# Patient Record
Sex: FEMALE | Race: WHITE | NOT HISPANIC OR LATINO | Employment: UNEMPLOYED | URBAN - METROPOLITAN AREA
[De-identification: names, ages, dates, MRNs, and addresses within clinical notes are randomized per-mention and may not be internally consistent; named-entity substitution may affect disease eponyms.]

---
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
## 2022-02-09 DIAGNOSIS — Z30.41 ENCOUNTER FOR SURVEILLANCE OF CONTRACEPTIVE PILLS: ICD-10-CM

## 2022-02-09 RX ORDER — DESOGESTREL AND ETHINYL ESTRADIOL AND ETHINYL ESTRADIOL 21-5 (28)
KIT ORAL
Qty: 28 TABLET | Refills: 3 | Status: SHIPPED | OUTPATIENT
Start: 2022-02-09 | End: 2022-07-01

## 2022-08-25 DIAGNOSIS — Z30.41 ENCOUNTER FOR SURVEILLANCE OF CONTRACEPTIVE PILLS: ICD-10-CM

## 2022-08-27 RX ORDER — DESOGESTREL AND ETHINYL ESTRADIOL AND ETHINYL ESTRADIOL 21-5 (28)
KIT ORAL
Qty: 28 TABLET | Refills: 1 | Status: SHIPPED | OUTPATIENT
Start: 2022-08-27 | End: 2022-10-24

## 2022-09-09 ENCOUNTER — OFFICE VISIT (OUTPATIENT)
Dept: FAMILY MEDICINE CLINIC | Facility: CLINIC | Age: 22
End: 2022-09-09
Payer: COMMERCIAL

## 2022-09-09 ENCOUNTER — APPOINTMENT (OUTPATIENT)
Dept: LAB | Facility: CLINIC | Age: 22
End: 2022-09-09
Payer: COMMERCIAL

## 2022-09-09 VITALS
DIASTOLIC BLOOD PRESSURE: 72 MMHG | HEART RATE: 79 BPM | WEIGHT: 164 LBS | HEIGHT: 61 IN | SYSTOLIC BLOOD PRESSURE: 110 MMHG | BODY MASS INDEX: 30.96 KG/M2 | OXYGEN SATURATION: 99 %

## 2022-09-09 DIAGNOSIS — Z13.0 ENCOUNTER FOR SICKLE-CELL SCREENING: ICD-10-CM

## 2022-09-09 DIAGNOSIS — Z00.00 ANNUAL PHYSICAL EXAM: Primary | ICD-10-CM

## 2022-09-09 LAB — SICKLE CELLS BLD QL SMEAR: NEGATIVE

## 2022-09-09 PROCEDURE — 99395 PREV VISIT EST AGE 18-39: CPT | Performed by: FAMILY MEDICINE

## 2022-09-09 PROCEDURE — 85660 RBC SICKLE CELL TEST: CPT

## 2022-09-09 PROCEDURE — 36415 COLL VENOUS BLD VENIPUNCTURE: CPT

## 2022-09-09 PROCEDURE — 3725F SCREEN DEPRESSION PERFORMED: CPT | Performed by: FAMILY MEDICINE

## 2022-09-09 NOTE — PROGRESS NOTES
ADULT ANNUAL [address]  [facility]    NAME: Gissel Alvarenga  AGE: 23 y o  SEX: female  : 2000     DATE: 2022     Assessment and Plan:     Problem List Items Addressed This Visit        Other    Encounter for sickle-cell screening - Primary    Relevant Orders    Sickle cell screen        Patient is here for sports physical   Requires sickle cell screening test     Immunizations and preventive care screenings were discussed with patient today  Appropriate education was printed on patient's after visit summary  Counseling:  Dental Health: discussed importance of regular tooth brushing, flossing, and dental visits  · Exercise: the importance of regular exercise/physical activity was discussed  Recommend exercise 3-5 times per week for at least 30 minutes  BMI Counseling: Body mass index is 30 74 kg/m²  The BMI is above normal  Nutrition recommendations include decreasing portion sizes, encouraging healthy choices of fruits and vegetables and decreasing fast food intake  Exercise recommendations include moderate physical activity 150 minutes/week  No pharmacotherapy was ordered  Rationale for BMI follow-up plan is due to patient being overweight or obese  Depression Screening and Follow-up Plan: Patient was screened for depression during today's encounter  They screened negative with a PHQ-2 score of 0  No follow-ups on file  Chief Complaint:     Chief Complaint   Patient presents with    Annual Exam      History of Present Illness:     Adult Annual Physical   Patient here for a comprehensive physical exam  The patient reports no problems  Diet and Physical Activity  · Diet/Nutrition: well balanced diet  · Exercise: walking        Depression Screening  PHQ-2/9 Depression Screening    Little interest or pleasure in doing things: 0 - not at all  Feeling down, depressed, or hopeless: 0 - not at all  PHQ-2 Score: 0  PHQ-2 Interpretation: Negative depression screen       General Health  · Sleep: sleeps well  · Hearing: normal - bilateral   · Vision: no vision problems  · Dental: regular dental visits  /GYN Health  · Last menstrual period: regular  · Contraceptive method: oral contraceptives  · History of STDs?: no      Review of Systems:     Review of Systems   Constitutional: Negative  Respiratory: Negative  Negative for shortness of breath  Cardiovascular: Negative  Negative for chest pain and palpitations  Musculoskeletal: Negative for myalgias  Neurological: Negative  Psychiatric/Behavioral: Negative  Past Medical History:     Past Medical History:   Diagnosis Date    Allergic     Seasonal; Last assessed 6/3/2014    Allergic rhinitis       Past Surgical History:     Past Surgical History:   Procedure Laterality Date    TONSILLECTOMY        Social History:     Social History     Socioeconomic History    Marital status: Single     Spouse name: None    Number of children: None    Years of education: None    Highest education level: None   Occupational History    None   Tobacco Use    Smoking status: Never Smoker    Smokeless tobacco: Never Used    Tobacco comment: no exposure to second hand smoke     Vaping Use    Vaping Use: Never used   Substance and Sexual Activity    Alcohol use: Never    Drug use: Never    Sexual activity: Yes     Partners: Male   Other Topics Concern    None   Social History Narrative    None     Social Determinants of Health     Financial Resource Strain: Not on file   Food Insecurity: Not on file   Transportation Needs: Not on file   Physical Activity: Not on file   Stress: Not on file   Social Connections: Not on file   Intimate Partner Violence: Not on file   Housing Stability: Not on file      Family History:     Family History   Problem Relation Age of Onset    No Known Problems Mother     No Known Problems Father     Breast cancer Maternal Grandmother  Allergies Family     Diabetes Family     Heart disease Family     Hypertension Family       Current Medications:     Current Outpatient Medications   Medication Sig Dispense Refill    Viorele 0 15-0 02/0 01 MG (21/5) per tablet TAKE 1 TABLET BY MOUTH DAILY 28 tablet 1     No current facility-administered medications for this visit  Allergies: Allergies   Allergen Reactions    Pollen Extract       Physical Exam:     /72   Pulse 79   Ht 5' 1 25" (1 556 m)   Wt 74 4 kg (164 lb)   SpO2 99%   BMI 30 74 kg/m²     Physical Exam  Vitals and nursing note reviewed  HENT:      Right Ear: External ear normal       Left Ear: External ear normal    Eyes:      Conjunctiva/sclera: Conjunctivae normal    Cardiovascular:      Rate and Rhythm: Normal rate and regular rhythm  Heart sounds: Normal heart sounds  Pulmonary:      Effort: Pulmonary effort is normal       Breath sounds: Normal breath sounds  Abdominal:      General: Bowel sounds are normal       Palpations: Abdomen is soft  Musculoskeletal:         General: Normal range of motion  Cervical back: Normal range of motion and neck supple  Skin:     General: Skin is warm  Neurological:      Mental Status: She is alert and oriented to person, place, and time  Psychiatric:         Behavior: Behavior normal          Thought Content:  Thought content normal          Judgment: Judgment normal           [place] [id number]

## 2022-09-09 NOTE — PATIENT INSTRUCTIONS

## 2022-10-21 DIAGNOSIS — Z30.41 ENCOUNTER FOR SURVEILLANCE OF CONTRACEPTIVE PILLS: ICD-10-CM

## 2022-10-24 RX ORDER — DESOGESTREL AND ETHINYL ESTRADIOL AND ETHINYL ESTRADIOL 21-5 (28)
KIT ORAL
Qty: 28 TABLET | Refills: 1 | Status: SHIPPED | OUTPATIENT
Start: 2022-10-24

## 2022-12-20 DIAGNOSIS — Z30.41 ENCOUNTER FOR SURVEILLANCE OF CONTRACEPTIVE PILLS: ICD-10-CM

## 2022-12-20 RX ORDER — DESOGESTREL AND ETHINYL ESTRADIOL 21-5 (28)
1 KIT ORAL DAILY
Qty: 28 TABLET | Refills: 2 | Status: SHIPPED | OUTPATIENT
Start: 2022-12-20

## 2023-03-02 ENCOUNTER — ANNUAL EXAM (OUTPATIENT)
Dept: OBGYN CLINIC | Facility: CLINIC | Age: 23
End: 2023-03-02

## 2023-03-02 VITALS — SYSTOLIC BLOOD PRESSURE: 132 MMHG | DIASTOLIC BLOOD PRESSURE: 80 MMHG | WEIGHT: 164.8 LBS | BODY MASS INDEX: 30.89 KG/M2

## 2023-03-02 DIAGNOSIS — Z01.419 ENCOUNTER FOR GYNECOLOGICAL EXAMINATION (GENERAL) (ROUTINE) WITHOUT ABNORMAL FINDINGS: Primary | ICD-10-CM

## 2023-03-02 DIAGNOSIS — Z11.3 SCREENING FOR STD (SEXUALLY TRANSMITTED DISEASE): ICD-10-CM

## 2023-03-02 DIAGNOSIS — Z30.41 ENCOUNTER FOR SURVEILLANCE OF CONTRACEPTIVE PILLS: ICD-10-CM

## 2023-03-02 RX ORDER — DESOGESTREL AND ETHINYL ESTRADIOL 21-5 (28)
1 KIT ORAL DAILY
Qty: 84 TABLET | Refills: 3 | Status: SHIPPED | OUTPATIENT
Start: 2023-03-02

## 2023-03-02 NOTE — PROGRESS NOTES
Assessment/Plan   Diagnoses and all orders for this visit:    Encounter for gynecological examination (general) (routine) without abnormal findings  Pap smears will start at age 26 per the ASCCP guidelines  Screening for STD (sexually transmitted disease)  -     Chlamydia/GC amplified DNA by PCR  Encourage safe sexual practices; STI testing - C/G collected; STI labs declined    Encounter for surveillance of contraceptive pills  -     desogestrel-ethinyl estradiol (Viorele) 0 15-0 02/0 01 MG (21/5) per tablet; Take 1 tablet by mouth daily  Contraception - reviewed restart, how to take, missed pill protocol, birth control efficacy, back-up, STI protection  Discussion  I have discussed the importance of monthly self-breast exams, exercise and healthy diet as well as adequate intake of calcium and vitamin D  Encourage MVI q day and r/julissa importance of folic acid; Encourage 30-40 min weight bearing exercise most days of week  The patient has had the Gardasil vaccine series, which is recommended for patients from 526 years of age  All questions have been answered to her satisfaction  RTO for APE or sooner if needed    Subjective     HPI   Sergei Ewing is a 23 y o  female who presents for annual well woman exam  Plays softball for [employer] - going to [employer] this weekend for a tournament  Menarche - 13; LMP - 2/7/23; Periods are reg q month and last 4-5 days; No excessive bleeding; No intermenstrual bleeding or spotting; Cramps are tolerable  Stopped Viorele about 2 months ago because ran out of Rx and desires to go back on; No vulvar itch/burn; No vaginal itch/burn; No abn discharge or odor;  No urinary sx - burning/pain/frequency/hematuria  (+) SBEs - no breast masses, asymmetry, nipple discharge or bleeding, changes in skin of breast, or breast tenderness bilaterally  No abd/pelvic pain or HAs;   Pt is not currently in a sexual relationship; Last sexual encounter was shortly after stopping the pill - used condoms but has abstained since because no BC on board; No issues with intercourse; She is ok to have STI cultures done; declines hiv/hep testing; Feels safe at home  Current contraception: Viorele in the past (switched from Loestrin  due to headaches); condoms  Gardasil - completed  (+) PCP for routine Bw/care; Last Pap - none  History of abnormal Pap smear:   Last STI screen - none    Review of Systems   Constitutional: Negative for activity change, fatigue, fever and unexpected weight change  HENT: Negative for congestion, dental problem, sinus pressure and sinus pain  Eyes: Negative for visual disturbance  Respiratory: Negative for cough, shortness of breath and wheezing  Cardiovascular: Negative for chest pain and leg swelling  Gastrointestinal: Negative for abdominal distention, abdominal pain, blood in stool, constipation, diarrhea, nausea and vomiting  Endocrine: Negative for polydipsia  Genitourinary: Negative for difficulty urinating, dyspareunia, dysuria, frequency, hematuria, menstrual problem, pelvic pain, urgency, vaginal bleeding, vaginal discharge and vaginal pain  Musculoskeletal: Negative for arthralgias and back pain  Allergic/Immunologic: Negative for environmental allergies  Neurological: Negative for dizziness, seizures and headaches  Psychiatric/Behavioral: Negative for dysphoric mood and sleep disturbance  The patient is not nervous/anxious          The following portions of the patient's history were reviewed and updated as appropriate: allergies, current medications, past family history, past medical history, past social history, past surgical history and problem list          OB History        0    Para   0    Term   0       0    AB   0    Living   0       SAB   0    IAB   0    Ectopic   0    Multiple   0    Live Births   0                 Past Medical History:   Diagnosis Date   • Allergic     Seasonal; Last assessed 6/3/2014   • Allergic rhinitis        Past Surgical History:   Procedure Laterality Date   • TONSILLECTOMY         Family History   Problem Relation Age of Onset   • No Known Problems Mother    • No Known Problems Father    • Breast cancer Maternal Grandmother         dx    • Allergies Family    • Diabetes Family    • Heart disease Family    • Hypertension Family        Social History     Socioeconomic History   • Marital status: Single     Spouse name: Not on file   • Number of children: Not on file   • Years of education: Not on file   • Highest education level: Not on file   Occupational History   • Not on file   Tobacco Use   • Smoking status: Never   • Smokeless tobacco: Never   • Tobacco comments:     no exposure to second hand smoke  Vaping Use   • Vaping Use: Never used   Substance and Sexual Activity   • Alcohol use: Never   • Drug use: Never   • Sexual activity: Yes     Partners: Male   Other Topics Concern   • Not on file   Social History Narrative   • Not on file     Social Determinants of Health     Financial Resource Strain: Not on file   Food Insecurity: Not on file   Transportation Needs: Not on file   Physical Activity: Not on file   Stress: Not on file   Social Connections: Not on file   Intimate Partner Violence: Not on file   Housing Stability: Not on file         Current Outpatient Medications:   •  desogestrel-ethinyl estradiol (Viorele) 0 15-0 02/0 01 MG (21/5) per tablet, Take 1 tablet by mouth daily, Disp: 84 tablet, Rfl: 3    Allergies   Allergen Reactions   • Pollen Extract        Objective   Vitals:    03/02/23 1128   BP: 132/80   BP Location: Left arm   Patient Position: Sitting   Cuff Size: Standard   Weight: 74 8 kg (164 lb 12 8 oz)     Physical Exam  Vitals reviewed  Constitutional:       General: She is awake  She is not in acute distress  Appearance: Normal appearance  She is well-developed and well-groomed   She is not ill-appearing, toxic-appearing or diaphoretic  HENT:      Head: Normocephalic and atraumatic  Eyes:      Conjunctiva/sclera: Conjunctivae normal    Neck:      Thyroid: No thyroid mass, thyromegaly or thyroid tenderness  Cardiovascular:      Rate and Rhythm: Normal rate and regular rhythm  Heart sounds: Normal heart sounds  No murmur heard  Pulmonary:      Effort: Pulmonary effort is normal  No tachypnea, bradypnea or respiratory distress  Breath sounds: Normal breath sounds  No stridor or decreased air movement  No wheezing  Chest:   Breasts:     Breasts are symmetrical       Right: Normal  No swelling, bleeding, inverted nipple, mass, nipple discharge, skin change or tenderness  Left: Normal  No swelling, bleeding, inverted nipple, mass, nipple discharge, skin change or tenderness  Abdominal:      General: There is no distension  Palpations: Abdomen is soft  There is no hepatomegaly, splenomegaly or mass  Tenderness: There is no abdominal tenderness  Hernia: No hernia is present  There is no hernia in the left inguinal area or right inguinal area  Genitourinary:     General: Normal vulva  Exam position: Supine  Pubic Area: No rash or pubic lice  Labia:         Right: No rash, tenderness, lesion or injury  Left: No rash, tenderness, lesion or injury  Urethra: No prolapse, urethral pain, urethral swelling or urethral lesion  Vagina: Normal  No signs of injury and foreign body  No vaginal discharge, erythema, tenderness, bleeding, lesions or prolapsed vaginal walls  Cervix: No cervical motion tenderness, discharge, friability, lesion, erythema or cervical bleeding  Uterus: Not deviated, not enlarged, not fixed, not tender and no uterine prolapse  Adnexa:         Right: No mass, tenderness or fullness  Left: No mass, tenderness or fullness  Lymphadenopathy:      Cervical: No cervical adenopathy        Upper Body:      Right upper body: No supraclavicular or axillary adenopathy  Left upper body: No supraclavicular or axillary adenopathy  Lower Body: No right inguinal adenopathy  No left inguinal adenopathy  Skin:     General: Skin is warm and dry  Neurological:      Mental Status: She is alert and oriented to person, place, and time  Psychiatric:         Mood and Affect: Mood and affect normal          Speech: Speech normal          Behavior: Behavior normal  Behavior is cooperative  Thought Content:  Thought content normal          Judgment: Judgment normal

## 2023-03-03 LAB
C TRACH DNA SPEC QL NAA+PROBE: NEGATIVE
N GONORRHOEA DNA SPEC QL NAA+PROBE: NEGATIVE

## 2024-02-02 DIAGNOSIS — Z30.41 ENCOUNTER FOR SURVEILLANCE OF CONTRACEPTIVE PILLS: ICD-10-CM

## 2024-02-05 RX ORDER — DESOGESTREL AND ETHINYL ESTRADIOL AND ETHINYL ESTRADIOL 21-5 (28)
1 KIT ORAL DAILY
Qty: 84 TABLET | Refills: 0 | Status: SHIPPED | OUTPATIENT
Start: 2024-02-05

## 2024-02-21 PROBLEM — Z00.121 ENCOUNTER FOR ROUTINE CHILD HEALTH EXAMINATION WITH ABNORMAL FINDINGS: Status: RESOLVED | Noted: 2018-10-22 | Resolved: 2024-02-21

## 2024-04-03 DIAGNOSIS — Z30.41 ENCOUNTER FOR SURVEILLANCE OF CONTRACEPTIVE PILLS: ICD-10-CM

## 2024-04-03 RX ORDER — DESOGESTREL AND ETHINYL ESTRADIOL AND ETHINYL ESTRADIOL 21-5 (28)
1 KIT ORAL DAILY
Qty: 84 TABLET | Refills: 0 | Status: SHIPPED | OUTPATIENT
Start: 2024-04-03

## 2024-05-07 NOTE — PROGRESS NOTES
Assessment/Plan   Diagnoses and all orders for this visit:    Encounter for gynecological examination (general) (routine) without abnormal findings  -     Liquid-based pap, screening  The current ASCCP guidelines were reviewed. Patient's last pap was never and therefore, a pap is indicated at this time. I emphasized the importance of an annual pelvic and breast exam. Patient ok to have a pap done today.    Counseling for initiation of birth control method  -     etonogestrel-ethinyl estradiol (NuvaRing) 0.12-0.015 MG/24HR vaginal ring; Insert vaginally and leave in place for 3 consecutive weeks, then remove for 1 week.  Contraception - Various contraceptive options were reviewed including, but not limited to, barrier methods (condoms, diaphragm), both combination (pill, patch, vaginal ring) and progesterone- only (pill, Depo provera, and Nexplanon), intrauterine devices (tahira, Mirena and Paragard). The mechanisms, risks, benefits, and side effects of all methods were discussed. All questions have been answered to her satisfaction.  Desires Nuva Ring:  Insert the Nuva ring vaginally on the Sunday after your menstrual period begins.   Keep it in place for 3 weeks.    On the 4th Sunday remove the ring.  Place a new ring in on the following Sunday.  Repeat above.  It common to experience some irregular bleeding when newly starting the Nuva ring.  This should resolve after 3 months of use.  Also minor side effects such as breast tenderness, minor headaches and nausea may occur, but typically resolve after continuing on the ring for at least 3 months.    Call if you experience severe headaches, visual disturbances, chest pain, shortness of breath, abdominal pain, pain tingling or weakness in arms or legs.  Advise a back-up method, like condoms, during the first month on the nuva ring, if misses any change out, or is put on antibiotics.   Advise safe sexual practices and the importance of condoms to prevent the  transmission of STDs  Encourage to give a fair 3 mo trial - if dislikes or desires to discuss alternate options - call or RTO.    Discussion  I have discussed the importance of monthly self-breast exams, exercise and healthy diet as well as adequate intake of calcium and vitamin D. Encourage MVI q day and r/julissa importance of folic acid; Encourage 30-40 min weight bearing exercise most days of week  Encourage safe sexual practices; STI testing - declines  The patient has had the Gardasil vaccine series, which is recommended for patients from 9-45 years of age.   All questions have been answered to her satisfaction  RTO for APE or sooner if needed    Subjective     HPI   Yuly More is a 23 y.o. female who presents for annual well woman exam. 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  No vulvar itch/burn; No vaginal itch/burn; No abn discharge or odor; No urinary sx - burning/pain/frequency/hematuria  (+) SBEs - no breast masses, asymmetry, nipple discharge or bleeding, changes in skin of breast, or breast tenderness bilaterally  No abd/pelvic pain or Has out of the ordinary;   Pt is not currently sexually active - recent break up a couple months ago; No issues intercourse; She declines sti/hiv/hep testing; Feels safe at home  Current contraception: Viorele - stopped x 1 month because ran out of pills; But wanted to discuss alternate options since she found she was missing pills/doubling up more frequently  Gardasil - completed  (+) PCP for routine Bw/care;    Last Pap - none  History of abnormal Pap smear:   Last STI screen - 3/2/23 - C/G neg    Review of Systems   Constitutional:  Negative for activity  change, fatigue, fever and unexpected weight change (weight is fluctuating - since stopped softball - figuring out what she can eat - working on diet and exercise).   HENT:  Negative for congestion, dental problem, sinus pressure and sinus pain.    Eyes:  Negative for visual disturbance.   Respiratory:  Negative for cough, shortness of breath and wheezing.    Cardiovascular:  Negative for chest pain and leg swelling.   Gastrointestinal:  Negative for abdominal distention, abdominal pain, blood in stool, constipation, diarrhea, nausea and vomiting.   Endocrine: Negative for polydipsia.   Genitourinary:  Negative for difficulty urinating, dyspareunia, dysuria, frequency, hematuria, menstrual problem, pelvic pain, urgency, vaginal bleeding, vaginal discharge and vaginal pain.   Musculoskeletal:  Negative for arthralgias and back pain.   Allergic/Immunologic: Negative for environmental allergies.   Neurological:  Negative for dizziness, seizures and headaches.   Psychiatric/Behavioral:  Negative for dysphoric mood and sleep disturbance. The patient is not nervous/anxious.        The following portions of the patient's history were reviewed and updated as appropriate: allergies, current medications, past family history, past medical history, past social history, past surgical history, and problem list.         OB History          0    Para   0    Term   0       0    AB   0    Living   0         SAB   0    IAB   0    Ectopic   0    Multiple   0    Live Births   0                 Past Medical History:   Diagnosis Date    Allergic     Seasonal; Last assessed 6/3/2014    Allergic rhinitis        Past Surgical History:   Procedure Laterality Date    TONSILLECTOMY         Family History   Problem Relation Age of Onset    No Known Problems Mother     No Known Problems Father     Breast cancer Maternal Grandmother         dx     Allergies Family     Diabetes Family     Heart disease Family     Hypertension  "Family        Social History     Socioeconomic History    Marital status: Single     Spouse name: Not on file    Number of children: Not on file    Years of education: Not on file    Highest education level: Not on file   Occupational History    Not on file   Tobacco Use    Smoking status: Never    Smokeless tobacco: Never    Tobacco comments:     no exposure to second hand smoke.   Vaping Use    Vaping status: Never Used   Substance and Sexual Activity    Alcohol use: Yes     Comment: socially    Drug use: Never    Sexual activity: Yes     Partners: Male     Birth control/protection: Condom Male, Rhythm, Coitus interruptus   Other Topics Concern    Not on file   Social History Narrative    Not on file     Social Determinants of Health     Financial Resource Strain: Not on file   Food Insecurity: Not on file   Transportation Needs: Not on file   Physical Activity: Not on file   Stress: Not on file   Social Connections: Not on file   Intimate Partner Violence: Not on file   Housing Stability: Not on file         Current Outpatient Medications:     loratadine (CLARITIN) 10 mg tablet, Take 10 mg by mouth daily, Disp: , Rfl:     Viorele 0.15-0.02/0.01 MG (21/5) per tablet, TAKE 1 TABLET BY MOUTH DAILY (Patient not taking: Reported on 5/8/2024), Disp: 84 tablet, Rfl: 0    Allergies   Allergen Reactions    Pollen Extract        Objective   Vitals:    05/08/24 0753   BP: 120/78   BP Location: Left arm   Patient Position: Sitting   Cuff Size: Standard   Weight: 87.1 kg (192 lb)   Height: 5' 2\" (1.575 m)     Physical Exam  Vitals reviewed.   Constitutional:       General: She is awake. She is not in acute distress.     Appearance: Normal appearance. She is well-developed and well-groomed. She is not ill-appearing, toxic-appearing or diaphoretic.   HENT:      Head: Normocephalic and atraumatic.   Eyes:      Conjunctiva/sclera: Conjunctivae normal.   Neck:      Thyroid: No thyroid mass, thyromegaly or thyroid tenderness. "   Cardiovascular:      Rate and Rhythm: Normal rate and regular rhythm.      Heart sounds: Normal heart sounds. No murmur heard.  Pulmonary:      Effort: Pulmonary effort is normal. No tachypnea, bradypnea or respiratory distress.      Breath sounds: Normal breath sounds. No stridor or decreased air movement. No wheezing.   Chest:   Breasts:     Breasts are symmetrical.      Right: Normal. No swelling, bleeding, inverted nipple, mass, nipple discharge, skin change or tenderness.      Left: Normal. No swelling, bleeding, inverted nipple, mass, nipple discharge, skin change or tenderness.   Abdominal:      General: There is no distension.      Palpations: Abdomen is soft. There is no hepatomegaly, splenomegaly or mass.      Tenderness: There is no abdominal tenderness.      Hernia: No hernia is present. There is no hernia in the left inguinal area or right inguinal area.   Genitourinary:     General: Normal vulva.      Exam position: Supine.      Pubic Area: No rash or pubic lice.       Labia:         Right: No rash, tenderness, lesion or injury.         Left: No rash, tenderness, lesion or injury.       Urethra: No prolapse, urethral pain, urethral swelling or urethral lesion.      Vagina: Normal. No signs of injury and foreign body. No vaginal discharge, erythema, tenderness, bleeding, lesions or prolapsed vaginal walls.      Cervix: No cervical motion tenderness, discharge, friability, lesion, erythema or cervical bleeding.      Uterus: Not deviated, not enlarged, not fixed, not tender and no uterine prolapse.       Adnexa:         Right: No mass, tenderness or fullness.          Left: No mass, tenderness or fullness.     Lymphadenopathy:      Cervical: No cervical adenopathy.      Upper Body:      Right upper body: No supraclavicular or axillary adenopathy.      Left upper body: No supraclavicular or axillary adenopathy.      Lower Body: No right inguinal adenopathy. No left inguinal adenopathy.   Skin:      General: Skin is warm and dry.   Neurological:      Mental Status: She is alert and oriented to person, place, and time.   Psychiatric:         Mood and Affect: Mood and affect normal.         Speech: Speech normal.         Behavior: Behavior normal. Behavior is cooperative.         Thought Content: Thought content normal.         Judgment: Judgment normal.

## 2024-05-08 ENCOUNTER — ANNUAL EXAM (OUTPATIENT)
Dept: OBGYN CLINIC | Facility: CLINIC | Age: 24
End: 2024-05-08
Payer: COMMERCIAL

## 2024-05-08 VITALS
DIASTOLIC BLOOD PRESSURE: 78 MMHG | SYSTOLIC BLOOD PRESSURE: 120 MMHG | BODY MASS INDEX: 35.33 KG/M2 | WEIGHT: 192 LBS | HEIGHT: 62 IN

## 2024-05-08 DIAGNOSIS — Z01.419 ENCOUNTER FOR GYNECOLOGICAL EXAMINATION (GENERAL) (ROUTINE) WITHOUT ABNORMAL FINDINGS: Primary | ICD-10-CM

## 2024-05-08 DIAGNOSIS — Z30.09 COUNSELING FOR INITIATION OF BIRTH CONTROL METHOD: ICD-10-CM

## 2024-05-08 PROCEDURE — S0612 ANNUAL GYNECOLOGICAL EXAMINA: HCPCS | Performed by: PHYSICIAN ASSISTANT

## 2024-05-08 PROCEDURE — G0145 SCR C/V CYTO,THINLAYER,RESCR: HCPCS | Performed by: PHYSICIAN ASSISTANT

## 2024-05-08 RX ORDER — ETONOGESTREL AND ETHINYL ESTRADIOL VAGINAL RING .015; .12 MG/D; MG/D
RING VAGINAL
Qty: 3 EACH | Refills: 3 | Status: SHIPPED | OUTPATIENT
Start: 2024-05-08

## 2024-05-08 RX ORDER — LORATADINE 10 MG/1
10 TABLET ORAL DAILY
COMMUNITY

## 2024-05-15 LAB
LAB AP GYN PRIMARY INTERPRETATION: NORMAL
LAB AP LMP: NORMAL
Lab: NORMAL

## 2024-07-23 ENCOUNTER — TELEPHONE (OUTPATIENT)
Age: 24
End: 2024-07-23

## 2024-07-23 DIAGNOSIS — Z30.41 SURVEILLANCE FOR BIRTH CONTROL, ORAL CONTRACEPTIVES: Primary | ICD-10-CM

## 2024-07-23 RX ORDER — DESOGESTREL AND ETHINYL ESTRADIOL 21-5 (28)
1 KIT ORAL DAILY
Qty: 84 TABLET | Refills: 3 | Status: SHIPPED | OUTPATIENT
Start: 2024-07-23

## 2024-07-23 NOTE — TELEPHONE ENCOUNTER
Patient states Nuvaring not covered by insurance per pharmacy.  Can her nuvaring be switched to another form of birth control.

## 2024-07-23 NOTE — TELEPHONE ENCOUNTER
Spoke with Yuly - has primary and secondary insurance - was covered by one insurance and not the other for the past couple of months, however, will be ending with insurance that covers; Can check cost through [employer]; Can check with current insurance if either EluRyng (generic of nuva ring) or Annovera is covered by insurance; Reviewed Annovera is the same ring used for 12 months. Other option would be switching back to Viorele or trial of OE patch - concern for OCP at annual was missing pills; concern for patch now is works outside () and a lot of sweating; will plan to restart OCP this Sunday - reviewed how to take, back-up, birth control efficacy, missed pill protocol, STI protection. If finds rings are not options due to cost and pill not working well - recommend appt to discuss LARCs/other BC options

## 2024-08-23 ENCOUNTER — OFFICE VISIT (OUTPATIENT)
Dept: FAMILY MEDICINE CLINIC | Facility: CLINIC | Age: 24
End: 2024-08-23
Payer: COMMERCIAL

## 2024-08-23 VITALS
BODY MASS INDEX: 37.32 KG/M2 | DIASTOLIC BLOOD PRESSURE: 70 MMHG | OXYGEN SATURATION: 98 % | HEART RATE: 99 BPM | HEIGHT: 62 IN | WEIGHT: 202.8 LBS | RESPIRATION RATE: 16 BRPM | TEMPERATURE: 98.6 F | SYSTOLIC BLOOD PRESSURE: 122 MMHG

## 2024-08-23 DIAGNOSIS — F41.1 GAD (GENERALIZED ANXIETY DISORDER): Primary | ICD-10-CM

## 2024-08-23 DIAGNOSIS — R45.1 RESTLESS: ICD-10-CM

## 2024-08-23 PROCEDURE — 99204 OFFICE O/P NEW MOD 45 MIN: CPT | Performed by: FAMILY MEDICINE

## 2024-08-23 RX ORDER — ESCITALOPRAM OXALATE 5 MG/1
5 TABLET ORAL DAILY
Qty: 30 TABLET | Refills: 1 | Status: SHIPPED | OUTPATIENT
Start: 2024-08-23

## 2024-08-23 NOTE — PROGRESS NOTES
Ambulatory Visit  Name: Yuly More      : 2000      MRN: [id number]  Encounter Provider: Feliciano Hayes MD  Encounter Date: 2024   Encounter department: [facility]    Assessment & Plan   1. JOSTIN (generalized anxiety disorder)  Comments:  New patient here to discuss medical managemnt of JOSTIN. Newly diagnosed and scored 18 today on GAD7. She is seeing a therapist and denies prior hospitalization. Dicussed classed on medications including SSRI. Agreed to start lexapro. Aware of s/e. May increase dose with partial response in 2 weeks. RTC 4-6 weeks or sooner as needed   Orders:  -     escitalopram (LEXAPRO) 5 mg tablet; Take 1 tablet (5 mg total) by mouth daily  -     TSH, 3rd generation with Free T4 reflex; Future  -     Vitamin B12; Future  -     CBC and differential; Future  2. BMI 37.0-37.9, adult  Comments:  FBW ordered. Exercise encouraged for overall health including mental health  Orders:  -     Comprehensive metabolic panel; Future  -     Lipid panel; Future  3. Restless  -     Comprehensive metabolic panel; Future  -     TSH, 3rd generation with Free T4 reflex; Future  -     Vitamin B12; Future      JOTSIN-7 Flowsheet Screening      Flowsheet Row Most Recent Value   Over the last two weeks, how often have you been bothered by the following problems?     Feeling nervous, anxious, or on edge 2   Not being able to stop or control worrying 1   Worrying too much about different things 3   Trouble relaxing  3   Being so restless that it's hard to sit still 3   Becoming easily annoyed or irritable  3   Feeling afraid as if something awful might happen 3   How difficult have these problems made it for you to do your work, take care of things at home, or get along with other people?  Somewhat difficult   JOSTIN Score  18          PHQ-2/9 Depression Screening    Little interest or pleasure in doing things: 0 - not at all  Feeling down, depressed, or hopeless: 1 - several  "days  Trouble falling or staying asleep, or sleeping too much: 1 - several days  Feeling tired or having little energy: 2 - more than half the days  Poor appetite or overeatin - several days  Feeling bad about yourself - or that you are a failure or have let yourself or your family down: 0 - not at all  Trouble concentrating on things, such as reading the newspaper or watching television: 0 - not at all  Moving or speaking so slowly that other people could have noticed. Or the opposite - being so fidgety or restless that you have been moving around a lot more than usual: 0 - not at all  Thoughts that you would be better off dead, or of hurting yourself in some way: 0 - not at all  PHQ-2 Score: 1  PHQ-2 Interpretation: Negative depression screen  PHQ-9 Score: 5  PHQ-9 Interpretation: Mild depression                History of Present Illness     Here to discuss medical management of anxiety   New and accompanied by her mother who is a patient of [facility]   States she's had anxiety symptoms since college but mother feels she had symptoms longer than that.   She recently established the therapist and has had 2 sessions thus far  She is here to discuss medication  Mother states she's bottled up feelings and afraid it will come out with therapy   Pt also admits to being very restless and constantly worrying   Was never put on medications         Review of Systems    Objective     /70 (BP Location: Left arm, Patient Position: Sitting, Cuff Size: Large)   Pulse 99   Temp 98.6 °F (37 °C) (Tympanic)   Resp 16   Ht 5' 2\" (1.575 m)   Wt 92 kg (202 lb 12.8 oz)   SpO2 98%   BMI 37.09 kg/m²     Physical Exam  Constitutional:       Appearance: Normal appearance.   HENT:      Head: Normocephalic and atraumatic.      Right Ear: Tympanic membrane normal.      Left Ear: Tympanic membrane normal.      Mouth/Throat:      Mouth: Mucous membranes are moist.   Eyes:      Extraocular Movements: Extraocular movements intact. "   Cardiovascular:      Rate and Rhythm: Normal rate and regular rhythm.      Heart sounds: No murmur heard.  Pulmonary:      Effort: Pulmonary effort is normal.      Breath sounds: Normal breath sounds.   Abdominal:      General: There is no distension.      Palpations: Abdomen is soft. There is no mass.      Tenderness: There is no abdominal tenderness. There is no guarding or rebound.      Hernia: No hernia is present.   Musculoskeletal:      Right lower leg: No edema.      Left lower leg: No edema.   Skin:     General: Skin is warm.   Neurological:      Mental Status: She is alert and oriented to person, place, and time.   Psychiatric:         Mood and Affect: Mood normal.         Behavior: Behavior normal.         Thought Content: Thought content normal.

## 2024-10-08 ENCOUNTER — OFFICE VISIT (OUTPATIENT)
Dept: FAMILY MEDICINE CLINIC | Facility: CLINIC | Age: 24
End: 2024-10-08
Payer: COMMERCIAL

## 2024-10-08 VITALS
RESPIRATION RATE: 16 BRPM | HEIGHT: 62 IN | BODY MASS INDEX: 38.64 KG/M2 | OXYGEN SATURATION: 98 % | HEART RATE: 91 BPM | SYSTOLIC BLOOD PRESSURE: 126 MMHG | DIASTOLIC BLOOD PRESSURE: 70 MMHG | WEIGHT: 210 LBS | TEMPERATURE: 98.1 F

## 2024-10-08 DIAGNOSIS — Z00.00 ENCOUNTER FOR ANNUAL PHYSICAL EXAM: Primary | ICD-10-CM

## 2024-10-08 DIAGNOSIS — F41.1 GAD (GENERALIZED ANXIETY DISORDER): ICD-10-CM

## 2024-10-08 PROCEDURE — 99395 PREV VISIT EST AGE 18-39: CPT | Performed by: FAMILY MEDICINE

## 2024-10-08 RX ORDER — ESCITALOPRAM OXALATE 10 MG/1
10 TABLET ORAL DAILY
Qty: 30 TABLET | Refills: 1 | Status: SHIPPED | OUTPATIENT
Start: 2024-10-08

## 2024-10-08 NOTE — PROGRESS NOTES
Adult Annual Physical  Name: Yuly More      : 2000      MRN: [id number]  Encounter Provider: Feliciano Hayes MD  Encounter Date: 10/8/2024   Encounter department: [facility]    Assessment & Plan  Encounter for annual physical exam  Pleasant 24 year old with unremarkable female exam. Discussed diet. Already exercises. Declined flu vaccine. UTD for pap        JOSTIN (generalized anxiety disorder)  Improved with lexapro 5 mg. Effects have waned the past few weeks. Increase to 10 mg and call with an update in 1 month  Orders:    escitalopram (LEXAPRO) 10 mg tablet; Take 1 tablet (10 mg total) by mouth daily    Immunizations and preventive care screenings were discussed with patient today. Appropriate education was printed on patient's after visit summary.    Counseling:  Dental Health: discussed importance of regular tooth brushing, flossing, and dental visits.  Injury prevention: discussed safety/seat belts, safety helmets, smoke detectors, carbon monoxide detectors, and smoking near bedding or upholstery.  Exercise: the importance of regular exercise/physical activity was discussed. Recommend exercise 3-5 times per week for at least 30 minutes.          History of Present Illness     Adult Annual Physical:  Patient presents for annual physical. Lexapro has helkped  Feels the effects are waning   Initially felt great and was able to focus more   Also less distracted and mid was no longer racing  Last fedw weeks, she 's flret that her symptoms are slowly returning   No side effects from the medication  .     Diet and Physical Activity:  - Diet/Nutrition: poor diet. was better when playing softball. Eating fast food.  - Exercise:. trying to get back into working out since softball 1 year ago    General Health:  - Sleep: sleeps well. not napping as much but does take 3-4 naps in a week  - Hearing: normal hearing bilateral ears.  - Vision: wears glasses. plans to follow p in the next 2  months  - Dental: regular dental visits. seen within the year    /GYN Health:  - Follows with GYN: no.   - Last menstrual cycle: 9/23/2024.   - History of STDs: no  - Contraception: oral contraceptives.      Advanced Care Planning:  - Has an advanced directive?: no    - Has a durable medical POA?: no    - ACP document given to patient?: no      Review of Systems   Psychiatric/Behavioral:  Positive for agitation, confusion and decreased concentration. Negative for self-injury, sleep disturbance and suicidal ideas. The patient is nervous/anxious.      Medical History Reviewed by provider this encounter:  Allergies  Meds  Problems       Past Medical History   Past Medical History:   Diagnosis Date    Allergic     Seasonal; Last assessed 6/3/2014    Allergic rhinitis      Past Surgical History:   Procedure Laterality Date    TONSILLECTOMY       Family History   Problem Relation Age of Onset    No Known Problems Mother     No Known Problems Father     Breast cancer Maternal Grandmother         dx ; recently diagnosed with breast CA second time - believes a different type    Allergies Family     Diabetes Family     Heart disease Family     Hypertension Family      Current Outpatient Medications on File Prior to Visit   Medication Sig Dispense Refill    desogestrel-ethinyl estradiol (KARIVA) 0.15-0.02/0.01 MG (21/5) per tablet Take 1 tablet by mouth daily 84 tablet 3    loratadine (CLARITIN) 10 mg tablet Take 10 mg by mouth daily      [DISCONTINUED] escitalopram (LEXAPRO) 5 mg tablet Take 1 tablet (5 mg total) by mouth daily 30 tablet 1     No current facility-administered medications on file prior to visit.     Allergies   Allergen Reactions    Pollen Extract       Current Outpatient Medications on File Prior to Visit   Medication Sig Dispense Refill    desogestrel-ethinyl estradiol (KARIVA) 0.15-0.02/0.01 MG (21/5) per tablet Take 1 tablet by mouth daily 84 tablet 3    loratadine (CLARITIN) 10 mg tablet Take 10  "mg by mouth daily      [DISCONTINUED] escitalopram (LEXAPRO) 5 mg tablet Take 1 tablet (5 mg total) by mouth daily 30 tablet 1     No current facility-administered medications on file prior to visit.      Social History     Tobacco Use    Smoking status: Never    Smokeless tobacco: Never    Tobacco comments:     no exposure to second hand smoke.   Vaping Use    Vaping status: Never Used   Substance and Sexual Activity    Alcohol use: Yes     Comment: socially    Drug use: Never    Sexual activity: Yes     Partners: Male     Birth control/protection: Condom Male, Rhythm, Coitus interruptus       Objective     /70 (BP Location: Left arm, Patient Position: Sitting, Cuff Size: Large)   Pulse 91   Temp 98.1 °F (36.7 °C) (Tympanic)   Resp 16   Ht 5' 2\" (1.575 m)   Wt 95.3 kg (210 lb)   SpO2 98%   BMI 38.41 kg/m²     Physical Exam  Vitals reviewed.   Constitutional:       Appearance: Normal appearance.   HENT:      Head: Normocephalic and atraumatic.   Cardiovascular:      Rate and Rhythm: Normal rate and regular rhythm.      Heart sounds: No murmur heard.  Skin:     General: Skin is warm.   Neurological:      Mental Status: She is alert.   Psychiatric:         Mood and Affect: Mood normal.         Behavior: Behavior normal.       "

## 2024-11-13 ENCOUNTER — OFFICE VISIT (OUTPATIENT)
Dept: FAMILY MEDICINE CLINIC | Facility: CLINIC | Age: 24
End: 2024-11-13
Payer: COMMERCIAL

## 2024-11-13 VITALS
SYSTOLIC BLOOD PRESSURE: 138 MMHG | RESPIRATION RATE: 16 BRPM | TEMPERATURE: 98.1 F | OXYGEN SATURATION: 97 % | HEART RATE: 86 BPM | HEIGHT: 62 IN | DIASTOLIC BLOOD PRESSURE: 83 MMHG | BODY MASS INDEX: 38.61 KG/M2 | WEIGHT: 209.8 LBS

## 2024-11-13 DIAGNOSIS — H69.91 ETD (EUSTACHIAN TUBE DYSFUNCTION), RIGHT: Primary | ICD-10-CM

## 2024-11-13 DIAGNOSIS — J01.40 ACUTE NON-RECURRENT PANSINUSITIS: ICD-10-CM

## 2024-11-13 PROCEDURE — 99213 OFFICE O/P EST LOW 20 MIN: CPT | Performed by: FAMILY MEDICINE

## 2024-11-13 NOTE — PROGRESS NOTES
Name: Yuly More      : 2000      MRN: [id number]  Encounter Provider: Feliciano Hayes MD  Encounter Date: 2024   Encounter department: [facility]  :  Suspect symptoms are due to a viral sinusitis with eustachian tube dysfunction.  Recommend antihistamine and nasal steroids.  Did discuss symptoms that suggest bacterial source which includes fevers(100.4 and above), purulent nasal discharge, bimodal presentation, or symptoms that persist for more than 7 to 10 days.  Asked to call should she have any of these symptoms.  Assessment & Plan  ETD (Eustachian tube dysfunction), right         Acute non-recurrent pansinusitis             History of Present Illness   Nasal congestion, sinus pressure, ear pain, dry cough, and eye redness since last Tuesday  [facility] and was told she had sinus infection, eye infection, and ear infection  Prescribed Augmentin, eye drops, cough medication, and Flonase  Started antibiotics on  and hasn't notice any improvement in the sinus symptom s  Pressure in the right.  No drainage. Had subjective fevers at the beginning of the illness but has resolved   No GI symptoms  Blowing out green mucous    History obtained from: patient  Review of Systems   HENT:  Positive for congestion, ear pain, sinus pressure and sinus pain. Negative for ear discharge.    Eyes:  Positive for redness.   Respiratory:  Positive for cough.    Cardiovascular: Negative.    Gastrointestinal: Negative.      Medical History Reviewed by provider this encounter:     .  Past Medical History   Past Medical History:   Diagnosis Date    Allergic     Seasonal; Last assessed 6/3/2014    Allergic rhinitis      Past Surgical History:   Procedure Laterality Date    TONSILLECTOMY       Family History   Problem Relation Age of Onset    No Known Problems Mother     No Known Problems Father     Breast cancer Maternal Grandmother         dx ; recently diagnosed with breast  "CA second time - believes a different type    Allergies Family     Diabetes Family     Heart disease Family     Hypertension Family       reports that she has never smoked. She has never used smokeless tobacco. She reports current alcohol use. She reports that she does not use drugs.  Current Outpatient Medications on File Prior to Visit   Medication Sig Dispense Refill    desogestrel-ethinyl estradiol (KARIVA) 0.15-0.02/0.01 MG (21/5) per tablet Take 1 tablet by mouth daily 84 tablet 3    escitalopram (LEXAPRO) 10 mg tablet Take 1 tablet (10 mg total) by mouth daily 30 tablet 1    loratadine (CLARITIN) 10 mg tablet Take 10 mg by mouth daily       No current facility-administered medications on file prior to visit.     Allergies   Allergen Reactions    Pollen Extract       Current Outpatient Medications on File Prior to Visit   Medication Sig Dispense Refill    desogestrel-ethinyl estradiol (KARIVA) 0.15-0.02/0.01 MG (21/5) per tablet Take 1 tablet by mouth daily 84 tablet 3    escitalopram (LEXAPRO) 10 mg tablet Take 1 tablet (10 mg total) by mouth daily 30 tablet 1    loratadine (CLARITIN) 10 mg tablet Take 10 mg by mouth daily       No current facility-administered medications on file prior to visit.      Social History     Tobacco Use    Smoking status: Never    Smokeless tobacco: Never    Tobacco comments:     no exposure to second hand smoke.   Vaping Use    Vaping status: Never Used   Substance and Sexual Activity    Alcohol use: Yes     Comment: socially    Drug use: Never    Sexual activity: Yes     Partners: Male     Birth control/protection: Condom Male, Rhythm, Coitus interruptus        Objective   /83 (BP Location: Left arm, Patient Position: Sitting, Cuff Size: Large)   Pulse 86   Temp 98.1 °F (36.7 °C) (Tympanic)   Resp 16   Ht 5' 2\" (1.575 m)   Wt 95.2 kg (209 lb 12.8 oz)   SpO2 97%   BMI 38.37 kg/m²      Physical Exam  Vitals reviewed.   Constitutional:       General: She is not in " acute distress.     Appearance: Normal appearance. She is not ill-appearing or toxic-appearing.   HENT:      Head: Normocephalic and atraumatic.      Right Ear: Swelling present. A middle ear effusion is present. Tympanic membrane is bulging. Tympanic membrane is not injected, scarred, perforated, erythematous or retracted.      Nose: Congestion present.      Right Sinus: No maxillary sinus tenderness or frontal sinus tenderness.      Left Sinus: No maxillary sinus tenderness or frontal sinus tenderness.      Comments: Tender ethmoid sinus   Eyes:      Extraocular Movements: Extraocular movements intact.   Cardiovascular:      Rate and Rhythm: Normal rate and regular rhythm.      Heart sounds: No murmur heard.     No gallop.   Pulmonary:      Effort: Pulmonary effort is normal.      Breath sounds: Normal breath sounds.   Lymphadenopathy:      Cervical: No cervical adenopathy.   Skin:     General: Skin is warm.   Neurological:      Mental Status: She is alert and oriented to person, place, and time.

## 2024-11-19 ENCOUNTER — PATIENT MESSAGE (OUTPATIENT)
Dept: FAMILY MEDICINE CLINIC | Facility: CLINIC | Age: 24
End: 2024-11-19

## 2024-11-19 DIAGNOSIS — H69.91 ETD (EUSTACHIAN TUBE DYSFUNCTION), RIGHT: Primary | ICD-10-CM

## 2024-11-21 RX ORDER — PREDNISONE 10 MG/1
20 TABLET ORAL DAILY
Qty: 10 TABLET | Refills: 0 | Status: SHIPPED | OUTPATIENT
Start: 2024-11-21 | End: 2024-11-26

## 2024-11-22 DIAGNOSIS — Z30.41 SURVEILLANCE FOR BIRTH CONTROL, ORAL CONTRACEPTIVES: ICD-10-CM

## 2024-11-22 RX ORDER — DESOGESTREL AND ETHINYL ESTRADIOL 21-5 (28)
1 KIT ORAL DAILY
Qty: 84 TABLET | Refills: 1 | Status: SHIPPED | OUTPATIENT
Start: 2024-11-22

## 2024-12-05 DIAGNOSIS — F41.1 GAD (GENERALIZED ANXIETY DISORDER): ICD-10-CM

## 2024-12-05 RX ORDER — ESCITALOPRAM OXALATE 10 MG/1
10 TABLET ORAL DAILY
Qty: 30 TABLET | Refills: 1 | Status: SHIPPED | OUTPATIENT
Start: 2024-12-05

## 2024-12-30 ENCOUNTER — PATIENT MESSAGE (OUTPATIENT)
Dept: FAMILY MEDICINE CLINIC | Facility: CLINIC | Age: 24
End: 2024-12-30

## 2024-12-30 DIAGNOSIS — L03.012 PARONYCHIA OF LEFT INDEX FINGER: ICD-10-CM

## 2024-12-30 DIAGNOSIS — L03.012 PARONYCHIA OF LEFT INDEX FINGER: Primary | ICD-10-CM

## 2025-02-10 DIAGNOSIS — F41.1 GAD (GENERALIZED ANXIETY DISORDER): ICD-10-CM

## 2025-02-11 RX ORDER — ESCITALOPRAM OXALATE 10 MG/1
10 TABLET ORAL DAILY
Qty: 30 TABLET | Refills: 5 | Status: SHIPPED | OUTPATIENT
Start: 2025-02-11

## 2025-03-05 ENCOUNTER — PATIENT MESSAGE (OUTPATIENT)
Dept: FAMILY MEDICINE CLINIC | Facility: CLINIC | Age: 25
End: 2025-03-05

## 2025-03-06 ENCOUNTER — OFFICE VISIT (OUTPATIENT)
Dept: FAMILY MEDICINE CLINIC | Facility: CLINIC | Age: 25
End: 2025-03-06
Payer: COMMERCIAL

## 2025-03-06 VITALS
TEMPERATURE: 97.8 F | SYSTOLIC BLOOD PRESSURE: 126 MMHG | BODY MASS INDEX: 39.2 KG/M2 | HEIGHT: 62 IN | HEART RATE: 98 BPM | OXYGEN SATURATION: 98 % | WEIGHT: 213 LBS | RESPIRATION RATE: 16 BRPM | DIASTOLIC BLOOD PRESSURE: 82 MMHG

## 2025-03-06 DIAGNOSIS — F32.2 SEVERE MAJOR DEPRESSIVE DISORDER (HCC): ICD-10-CM

## 2025-03-06 DIAGNOSIS — L60.0 INGROWN NAIL OF GREAT TOE OF LEFT FOOT: Primary | ICD-10-CM

## 2025-03-06 DIAGNOSIS — H69.91 ETD (EUSTACHIAN TUBE DYSFUNCTION), RIGHT: ICD-10-CM

## 2025-03-06 PROCEDURE — 99214 OFFICE O/P EST MOD 30 MIN: CPT | Performed by: FAMILY MEDICINE

## 2025-03-06 NOTE — PROGRESS NOTES
"Name: Yuly More      : 2000      MRN: [id number]  Encounter Provider: Feliciano Hayes MD  Encounter Date: 3/6/2025   Encounter department: [facility]  :  Assessment & Plan  Ingrown nail of great toe of left foot  Chronic ingrown nail on the lateral great toe with swelling and serous pus present. Pt became nauseous with the thought of having th procedure. Will provide a referral. In the meantime, soak the in warm water and epson salt or tejas dish soap. Asked to call if unable to schedule   Orders:    Ambulatory Referral to Podiatry; Future    Severe major depressive disorder (HCC)  Stable          ETD (Eustachian tube dysfunction), right  Suspected diagnosis given the pain and muffles sounds. Right ear benign with the exception of the cerumen               History of Present Illness   Here with mother to address chronic ingrown on the left foot  Can be painful and swollen       Review of Systems   Constitutional:  Negative for appetite change and fever.   HENT:  Positive for ear pain. Negative for ear discharge.    Skin:  Positive for wound.       Objective   /82 (BP Location: Left arm, Patient Position: Sitting, Cuff Size: Standard)   Pulse 98   Temp 97.8 °F (36.6 °C) (Tympanic)   Resp 16   Ht 5' 2\" (1.575 m)   Wt 96.6 kg (213 lb)   SpO2 98%   BMI 38.96 kg/m²      Physical Exam  Vitals reviewed.   Constitutional:       General: She is not in acute distress.     Appearance: Normal appearance. She is not ill-appearing.   Pulmonary:      Effort: Pulmonary effort is normal.   Musculoskeletal:        Feet:    Feet:      Left foot:      Toenail Condition: Left toenails are ingrown.   Neurological:      Mental Status: She is alert.       "

## 2025-04-02 ENCOUNTER — TELEPHONE (OUTPATIENT)
Age: 25
End: 2025-04-02

## 2025-04-02 NOTE — TELEPHONE ENCOUNTER
Patient has been added to the Talk Therapy wait list without a referral.    Insurance: [employer]  Insurance Type:    Commercial [x]   Medicaid []   [facility] (if applicable)   Medicare [x]  Location Preference: [place]  Provider Preference: female  Virtual: Yes [x] No []  Were outside resources sent: Yes [x] No []

## 2025-04-10 ENCOUNTER — OFFICE VISIT (OUTPATIENT)
Dept: FAMILY MEDICINE CLINIC | Facility: CLINIC | Age: 25
End: 2025-04-10
Payer: COMMERCIAL

## 2025-04-10 VITALS
TEMPERATURE: 97.2 F | RESPIRATION RATE: 18 BRPM | WEIGHT: 215.6 LBS | HEIGHT: 62 IN | SYSTOLIC BLOOD PRESSURE: 120 MMHG | HEART RATE: 114 BPM | DIASTOLIC BLOOD PRESSURE: 76 MMHG | BODY MASS INDEX: 39.67 KG/M2 | OXYGEN SATURATION: 98 %

## 2025-04-10 DIAGNOSIS — F32.2 SEVERE MAJOR DEPRESSIVE DISORDER (HCC): Primary | ICD-10-CM

## 2025-04-10 DIAGNOSIS — J30.2 SEASONAL ALLERGIES: ICD-10-CM

## 2025-04-10 PROCEDURE — 99214 OFFICE O/P EST MOD 30 MIN: CPT | Performed by: FAMILY MEDICINE

## 2025-04-10 RX ORDER — MONTELUKAST SODIUM 10 MG/1
10 TABLET ORAL
Qty: 30 TABLET | Refills: 1 | Status: SHIPPED | OUTPATIENT
Start: 2025-04-10

## 2025-04-10 NOTE — ASSESSMENT & PLAN NOTE
Improved but still experiencing breakthrough depression.  Continue Lexapro 10 mg daily and plans to establish with a new therapist.  Referral placed for [facility].    Orders:    Ambulatory referral to Psych Services; Future

## 2025-04-10 NOTE — ASSESSMENT & PLAN NOTE
Reports severe allergy symptoms during the spring and summer months.  Patient also cuts grass in the summer.  She is known for getting hayfever yearly and would like prescription to help reduce her symptoms.  Patient has tried several over-the-counter medications including Allegra-D, Claritin, Zyrtec, and Flonase.  She typically experiences itchy watery eyes and nasal congestion.  Recommend Singulair 10 mg at night with an antihistamine of her choice.  Also suggest switching antihistamines every season.  Aware montelukast can sometimes bring on depressive symptoms and asked to call if her depression gets worse.  Orders:    montelukast (SINGULAIR) 10 mg tablet; Take 1 tablet (10 mg total) by mouth daily at bedtime

## 2025-04-10 NOTE — PROGRESS NOTES
Name: Yuly More      : 2000      MRN: [id number]  Encounter Provider: Feliciano Hayes MD  Encounter Date: 4/10/2025   Encounter department: [facility]  :  Assessment & Plan  Severe major depressive disorder (HCC)  Improved but still experiencing breakthrough depression.  Continue Lexapro 10 mg daily and plans to establish with a new therapist.  Referral placed for [facility].    Orders:    Ambulatory referral to Psych Services; Future    Seasonal allergies  Reports severe allergy symptoms during the spring and summer months.  Patient also cuts grass in the summer.  She is known for getting hayfever yearly and would like prescription to help reduce her symptoms.  Patient has tried several over-the-counter medications including Allegra-D, Claritin, Zyrtec, and Flonase.  She typically experiences itchy watery eyes and nasal congestion.  Recommend Singulair 10 mg at night with an antihistamine of her choice.  Also suggest switching antihistamines every season.  Aware montelukast can sometimes bring on depressive symptoms and asked to call if her depression gets worse.  Orders:    montelukast (SINGULAIR) 10 mg tablet; Take 1 tablet (10 mg total) by mouth daily at bedtime           History of Present Illness   Patient is here for 6-month follow-up.  She is here with her mother.  Patient is doing okay.  Does feel better on Lexapro.  Does have moments of sadness that last a week.  She tries to stay positive and work through the negative thoughts.  Recently moved in with her boyfriend.  Was seeing a therapist but did not think her style of therapy worked for her.  Also wanted in person visits and could only offer virtual.  She plans to see someone within the network and is requesting referral.      Review of Systems   Constitutional:  Negative for fever.   HENT:  Positive for congestion.    Eyes:  Positive for redness and itching.   Psychiatric/Behavioral:   "Positive for dysphoric mood (Improved). Negative for sleep disturbance.        Objective   /76 (BP Location: Left arm, Patient Position: Sitting, Cuff Size: Large)   Pulse (!) 114   Temp (!) 97.2 °F (36.2 °C) (Tympanic)   Resp 18   Ht 5' 2\" (1.575 m)   Wt 97.8 kg (215 lb 9.6 oz)   SpO2 98%   BMI 39.43 kg/m²      Physical Exam  Vitals reviewed.   Constitutional:       General: She is not in acute distress.     Appearance: Normal appearance. She is not ill-appearing or toxic-appearing.   HENT:      Head: Normocephalic and atraumatic.   Eyes:      Extraocular Movements: Extraocular movements intact.   Pulmonary:      Effort: Pulmonary effort is normal.   Skin:     General: Skin is warm.   Neurological:      Mental Status: She is alert and oriented to person, place, and time.         "

## 2025-05-08 ENCOUNTER — OFFICE VISIT (OUTPATIENT)
Dept: BEHAVIORAL/MENTAL HEALTH CLINIC | Facility: CLINIC | Age: 25
End: 2025-05-08
Payer: COMMERCIAL

## 2025-05-08 DIAGNOSIS — F32.A MILD DEPRESSION: Primary | ICD-10-CM

## 2025-05-08 PROCEDURE — 90791 PSYCH DIAGNOSTIC EVALUATION: CPT | Performed by: SOCIAL WORKER
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
## 2025-05-11 DIAGNOSIS — Z30.41 SURVEILLANCE FOR BIRTH CONTROL, ORAL CONTRACEPTIVES: ICD-10-CM

## 2025-05-12 RX ORDER — DESOGESTREL AND ETHINYL ESTRADIOL AND ETHINYL ESTRADIOL 21-5 (28)
1 KIT ORAL DAILY
Qty: 84 TABLET | Refills: 0 | Status: SHIPPED | OUTPATIENT
Start: 2025-05-12

## 2025-05-15 ENCOUNTER — TELEMEDICINE (OUTPATIENT)
Dept: BEHAVIORAL/MENTAL HEALTH CLINIC | Facility: CLINIC | Age: 25
End: 2025-05-15
Payer: COMMERCIAL

## 2025-05-15 DIAGNOSIS — F32.A MILD DEPRESSION: Primary | ICD-10-CM

## 2025-05-15 PROCEDURE — 90834 PSYTX W PT 45 MINUTES: CPT | Performed by: SOCIAL WORKER

## 2025-05-15 NOTE — BH CRISIS PLAN
Client Name: Yuly More       Client YOB: 2000    [facility] Safety Plan      Creation Date: 5/15/25    Created By: Eda Graham LCSW       Step 1: Warning Signs:   Warning Signs   I push people away   Heavy chest   Hands get sweaty            Step 2: Internal Coping Strategies:   Internal Coping Strategies   I take a couple minutes with my dogs   Go for a walk   Focus on doing other things            Step 3: People and social settings that provide distraction:   Name Contact Information   Mom, Boyfriend, Brother In cell phone            Step 4: People whom I can ask for help during a crisis:      Name Contact Information    Mom, brother, boyfriend In cell phone      Step 5: Professionals or agencies I can contact during a crisis:      Clinican/Agency Name Phone Emergency Contact    Eda Graham [phone]       [facility] Emergency Department Emergency Department Phone Emergency Department Address    [facility] [phone]         Crisis Phone Numbers:   Suicide Prevention Lifeline: Call or Text  796 Crisis Text Line: Text HOME to 616-436   Please note: Some [facility] do not have a separate number for Child/Adolescent specific crisis. If your county is not listed under Child/Adolescent, please call the adult number for your county      Adult Crisis Numbers: Child/Adolescent Crisis Numbers   [facility]: [phone] [facility]: [phone]   [facility]: [phone] [facility]: [phone]   [facility]: [phone] [place]: [phone]   [facility]: [phone] [place]: [phone]   [place]: [phone]   [facility]: [phone]   [facility]: [phone]   [facility]: [phone] (daytime) [phone] (after hours, weekends, holidays)      Step 6: Making the environment safer (plan for lethal means safety):   Patient did not identify any lethal methods: Yes     Optional:  What is most important to me and worth living for?   My dogs; My family     [facility] Safety Plan. Sada Meredith and Anastacio Huynh. Used with permission of the authors.
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
## 2025-05-15 NOTE — BH TREATMENT PLAN
Outpatient Behavioral Health Psychotherapy Treatment Plan    Yuly Argenis  2000     Date of Initial Psychotherapy Assessment: 5/8/25  Date of Current Treatment Plan: 05/15/25  Treatment Plan Target Date: 11/15/25  Treatment Plan Expiration Date: 11/15/25    Diagnosis:   1. Mild depression            Area(s) of Need: Reduce anxiety, depression and stressors    Long Term Goal 1 (in the client's own words): Better ways to handle things; Dealing with emotions    Stage of Change: Preparation    Target Date for completion: 11/15/25     Anticipated therapeutic modalities: Engagement, Mindfulness, Supportive, Solution focused     People identified to complete this goal: Yuly      Objective 1: (identify the means of measuring success in meeting the objective): Utilize skills discussed in sessions; Reduce symptoms by 50%              I am currently under the care of a [facility] psychiatric provider: no    My [facility] psychiatric provider is: na    I am currently taking psychiatric medications: Yes, as prescribed    I feel that I will be ready for discharge from mental health care when I reach the following (measurable goal/objective): When I am coping better with my emotions    For children and adults who have a legal guardian:   Has there been any change to custody orders and/or guardianship status? N/A. If yes, attach updated documentation.    I have created my Crisis Plan and have been offered a copy of this plan    Behavioral Health Treatment Plan  [facility]: Diagnosis and Treatment Plan explained to Yuly Smithy Argenis acknowledges an understanding of their diagnosis. Yuly More agrees to this treatment plan.    I have been offered a copy of this Treatment Plan. yes

## 2025-05-20 ENCOUNTER — TELEPHONE (OUTPATIENT)
Dept: PSYCHIATRY | Facility: CLINIC | Age: 25
End: 2025-05-20

## 2025-05-21 ENCOUNTER — OFFICE VISIT (OUTPATIENT)
Dept: FAMILY MEDICINE CLINIC | Facility: CLINIC | Age: 25
End: 2025-05-21
Payer: COMMERCIAL

## 2025-05-21 ENCOUNTER — NURSE TRIAGE (OUTPATIENT)
Age: 25
End: 2025-05-21

## 2025-05-21 VITALS
HEIGHT: 62 IN | RESPIRATION RATE: 20 BRPM | DIASTOLIC BLOOD PRESSURE: 90 MMHG | OXYGEN SATURATION: 96 % | BODY MASS INDEX: 40.45 KG/M2 | WEIGHT: 219.8 LBS | TEMPERATURE: 98.2 F | SYSTOLIC BLOOD PRESSURE: 124 MMHG | HEART RATE: 91 BPM

## 2025-05-21 DIAGNOSIS — M79.89 BILATERAL HAND SWELLING: Primary | ICD-10-CM

## 2025-05-21 PROCEDURE — 99213 OFFICE O/P EST LOW 20 MIN: CPT | Performed by: FAMILY MEDICINE

## 2025-05-21 NOTE — TELEPHONE ENCOUNTER
Regarding: ? bug bite  ----- Message from Greta DIAZ sent at 5/21/2025  9:03 AM EDT -----  Hi my hand is swollen and I'm not sure if its just sunburn or if I got bit by something. Here is pictures of it. If you could look at it and then let me know if I need to make an appointment or what I should do.   Thank you   Attachments

## 2025-05-21 NOTE — TELEPHONE ENCOUNTER
"FOLLOW UP: Same day OV scheduled with Primary Care Provider at 4:15    REASON FOR CONVERSATION: Insect Bite    SYMPTOMS: Right hand swelling, redness and tenderness that started 5/17. Unsure if possible bug bite verses sun burn. Swelling is spreading and is warm to touch. Denies fevers. Complains of intermittent tingling in area and some tightness in fingers related to swelling. 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 TYPE of INSECT: \"What type of insect was it?\"       Unsure   2. ONSET: \"When did you get bitten?\"       Over the weekend   3. LOCATION: \"Where is the insect bite located?\"       Right hand   4. REDNESS: \"Is the area red or pink?\" If Yes, ask: \"What size is area of redness?\" (inches or cm). \"When did the redness start?\"      Redness, seems to be spreading   5. PAIN: \"Is there any pain?\" If Yes, ask: \"How bad is it?\"  (Scale 1-10; or mild, moderate, severe)      Mild pain  that comes and goes   6. ITCHING: \"Does it itch?\" If Yes, ask: \"How bad is the itch?\"       Denies   7. SWELLING: \"How big is the swelling?\" (inches, cm, or compare to coins)      See MyChart pictures  8. OTHER SYMPTOMS: \"Do you have any other symptoms?\"  (e.g., difficulty breathing, hives)      Tingling in area that is red and swollen, feels tightness in pointer finger   9. PREGNANCY: \"Is there any chance you are pregnant?\" \"When was your last menstrual period?\"      Denies    Protocols used: Insect Bite-Adult-[place]    "

## 2025-05-21 NOTE — PROGRESS NOTES
"Name: Yuly More      : 2000      MRN: [id number]  Encounter Provider: Feliciano Hayes MD  Encounter Date: 2025   Encounter department: [facility]  :  Assessment & Plan  Bilateral hand swelling  Swelling and redness noted on the back of both hands. Right > left. 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 She was also gardening this weekend. Does not appear infected. Suspect sunburn. Recommend cold compress, elevation, and to apply cold aloe to the hand. Also recommend wearing gloves and sunscreen. F/u as needed               History of Present Illness   Seen today with swelling and redness in both hands. Started over the weekend. States she was out most of the day attending sporting events. 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 She is unsure if she was bitten by something. Right hand is more swollen that the left. Also notes increased warmth and tingling sensation in the right hand       Review of Systems   Musculoskeletal:         Hand swelling    Skin:  Positive for color change.       Objective   /90 (BP Location: Left arm, Patient Position: Sitting, Cuff Size: Large)   Pulse 91   Temp 98.2 °F (36.8 °C) (Temporal)   Resp 20   Ht 5' 2\" (1.575 m)   Wt 99.7 kg (219 lb 12.8 oz)   SpO2 96%   BMI 40.20 kg/m²      Physical Exam  Vitals reviewed.   Constitutional:       General: She is not in acute distress.     Appearance: Normal appearance. She is not ill-appearing.     Musculoskeletal:      Right hand: Swelling (back of the hand up to the wrist) present. No tenderness or bony tenderness. Normal range of motion.      Left hand: Swelling (back of the hand up to the wrist) present. No tenderness or bony tenderness. Normal range of motion.     Skin:     Findings: Erythema present.     Neurological:      Mental Status: She is alert and oriented to person, place, and time. "     Psychiatric:         Mood and Affect: Mood normal.         Behavior: Behavior normal.         Thought Content: Thought content normal.

## 2025-05-22 ENCOUNTER — SOCIAL WORK (OUTPATIENT)
Dept: BEHAVIORAL/MENTAL HEALTH CLINIC | Facility: CLINIC | Age: 25
End: 2025-05-22
Payer: COMMERCIAL

## 2025-05-22 DIAGNOSIS — F32.A MILD DEPRESSION: Primary | ICD-10-CM

## 2025-05-22 PROCEDURE — 90834 PSYTX W PT 45 MINUTES: CPT | Performed by: SOCIAL WORKER

## 2025-05-22 NOTE — PSYCH
"Behavioral Health Psychotherapy Progress Note    Psychotherapy Provided: Individual Psychotherapy     1. Mild depression            Goals addressed in session: Goal 1     DATA:   During this session, this clinician used the following therapeutic modalities: Engagement Strategies, Client-centered Therapy, Mindfulness-based Strategies, and Supportive Psychotherapy    Substance Abuse was not addressed during this session. If the client is diagnosed with a co-occurring substance use disorder, please indicate any changes in the frequency or amount of use: na. Stage of change for addressing substance use diagnoses: No substance use/Not applicable    ASSESSMENT:  Yuly More presents with a Euthymic/ normal mood.     her affect is Normal range and intensity, which is congruent, with her mood and the content of the session. The client has made progress on their goals.     Yuly More presents with a none risk of suicide, none risk of self-harm, and none risk of harm to others.    For any risk assessment that surpasses a \"low\" rating, a safety plan must be developed.    A safety plan was indicated: no  If yes, describe in detail na    PLAN: Between sessions, Yuly More will continue to utilize coping skills. At the next session, the therapist will use Engagement Strategies, Client-centered Therapy, Mindfulness-based Strategies, and Supportive Psychotherapy to address depression.    Behavioral Health Treatment Plan and Discharge Planning: Yuly More is aware of and agrees to continue to work on their treatment plan. They have identified and are working toward their discharge goals. yes    Depression Follow-up Plan Completed: No    SUBJECTIVE: Pt presented for follow up therapy session.  Pt reported that she is going to visit her Uncle in residential along with her mother and grandmother today.  Pt wanted to talk about her grandmother this session.  She explained that her grandmother had breast " cancer about 8 years ago and had to go through Chemotherapy and Radiation.  These therapies affected her brain and memory.  Pt discussed how frustrated she can get with her grandmother at times.  She offered instances where this happens.  Pt's mother took her grandmother to see a Gerontologist yesterday.  She scored in the middle range on testing and now must get an MRI of her brain.  Pt spoke about how her grandfather ignores her grandmother's issues and her other Uncle is denial that there is anything wrong.  This is frustrating for pt and her mother.  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  Processed pt's feelings, offered validation and suggestions for coping.     Visit start and stop times:    05/22/25  Start Time: 0803  Stop Time: 0844  Total Visit Time: 41 minutes

## 2025-05-28 ENCOUNTER — TELEPHONE (OUTPATIENT)
Dept: BEHAVIORAL/MENTAL HEALTH CLINIC | Facility: CLINIC | Age: 25
End: 2025-05-28

## 2025-05-28 NOTE — TELEPHONE ENCOUNTER
Unable to leave voicemail informing patient of the Psych Encounter form needing to be signed as a requirement from the insurance company for billing purposes. If patients contacts office, please make patient aware that the form can be accessed via [employer] to sign electronically and must be signed for each visit as a requirement to continue future visits with provider.

## 2025-06-09 DIAGNOSIS — J30.2 SEASONAL ALLERGIES: ICD-10-CM

## 2025-06-09 RX ORDER — MONTELUKAST SODIUM 10 MG/1
10 TABLET ORAL
Qty: 30 TABLET | Refills: 5 | Status: SHIPPED | OUTPATIENT
Start: 2025-06-09 | End: 2025-06-11

## 2025-06-10 ENCOUNTER — PATIENT MESSAGE (OUTPATIENT)
Dept: FAMILY MEDICINE CLINIC | Facility: CLINIC | Age: 25
End: 2025-06-10

## 2025-06-19 ENCOUNTER — SOCIAL WORK (OUTPATIENT)
Dept: BEHAVIORAL/MENTAL HEALTH CLINIC | Facility: CLINIC | Age: 25
End: 2025-06-19
Payer: COMMERCIAL

## 2025-06-19 DIAGNOSIS — F32.A MILD DEPRESSION: Primary | ICD-10-CM

## 2025-06-19 PROCEDURE — 90834 PSYTX W PT 45 MINUTES: CPT | Performed by: SOCIAL WORKER
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
## 2025-07-03 ENCOUNTER — TELEMEDICINE (OUTPATIENT)
Dept: BEHAVIORAL/MENTAL HEALTH CLINIC | Facility: CLINIC | Age: 25
End: 2025-07-03
Payer: COMMERCIAL

## 2025-07-03 DIAGNOSIS — F32.A MILD DEPRESSION: Primary | ICD-10-CM

## 2025-07-03 PROCEDURE — 90834 PSYTX W PT 45 MINUTES: CPT | Performed by: SOCIAL WORKER
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
## 2025-07-10 ENCOUNTER — OFFICE VISIT (OUTPATIENT)
Dept: FAMILY MEDICINE CLINIC | Facility: CLINIC | Age: 25
End: 2025-07-10
Payer: COMMERCIAL

## 2025-07-10 ENCOUNTER — SOCIAL WORK (OUTPATIENT)
Dept: BEHAVIORAL/MENTAL HEALTH CLINIC | Facility: CLINIC | Age: 25
End: 2025-07-10
Payer: COMMERCIAL

## 2025-07-10 VITALS
HEART RATE: 115 BPM | SYSTOLIC BLOOD PRESSURE: 126 MMHG | BODY MASS INDEX: 39.93 KG/M2 | TEMPERATURE: 96 F | HEIGHT: 62 IN | OXYGEN SATURATION: 96 % | WEIGHT: 217 LBS | RESPIRATION RATE: 17 BRPM | DIASTOLIC BLOOD PRESSURE: 80 MMHG

## 2025-07-10 DIAGNOSIS — F41.1 GAD (GENERALIZED ANXIETY DISORDER): ICD-10-CM

## 2025-07-10 DIAGNOSIS — F32.1 CURRENT MODERATE EPISODE OF MAJOR DEPRESSIVE DISORDER WITHOUT PRIOR EPISODE (HCC): Primary | ICD-10-CM

## 2025-07-10 PROCEDURE — 99213 OFFICE O/P EST LOW 20 MIN: CPT | Performed by: FAMILY MEDICINE

## 2025-07-10 PROCEDURE — 90837 PSYTX W PT 60 MINUTES: CPT | Performed by: SOCIAL WORKER

## 2025-07-10 RX ORDER — ESCITALOPRAM OXALATE 20 MG/1
20 TABLET ORAL DAILY
Qty: 30 TABLET | Refills: 1 | Status: SHIPPED | OUTPATIENT
Start: 2025-07-10

## 2025-07-10 NOTE — PSYCH
"Behavioral Health Psychotherapy Progress Note    Psychotherapy Provided: Individual Psychotherapy     1. Current moderate episode of major depressive disorder without prior episode (HCC)            Goals addressed in session: Goal 1     DATA:   During this session, this clinician used the following therapeutic modalities: Engagement Strategies, Client-centered Therapy, Mindfulness-based Strategies, and Supportive Psychotherapy    Substance Abuse was not addressed during this session. If the client is diagnosed with a co-occurring substance use disorder, please indicate any changes in the frequency or amount of use: NA. Stage of change for addressing substance use diagnoses: No substance use/Not applicable    ASSESSMENT:  Yuly More presents with a Euthymic/ normal mood.     her affect is Normal range and intensity, which is congruent, with her mood and the content of the session. The client has made progress on their goals.     Yuly More presents with a none risk of suicide, none risk of self-harm, and none risk of harm to others.    For any risk assessment that surpasses a \"low\" rating, a safety plan must be developed.    A safety plan was indicated: no  If yes, describe in detail NA    PLAN: Between sessions, Yuly More will continue to utilize coping skills. At the next session, the therapist will use Engagement Strategies, Client-centered Therapy, Mindfulness-based Strategies, and Supportive Psychotherapy to address depression.    Behavioral Health Treatment Plan and Discharge Planning: Yuly More is aware of and agrees to continue to work on their treatment plan. They have identified and are working toward their discharge goals. yes    Depression Follow-up Plan Completed: Yes     PHQ-9 = 14    SUBJECTIVE: Pt presented for follow up therapy session.  Pt reported that she is trying to work on improving her communication with Rahat. Pt spoke about how irritable, frustrated and " down she's been feeling.  She feels as though this is due to not being where she wants to be in life.  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 Completed the PHQ-9 with pt and she scored 14 compared to 4 back on 5/8/25.  Offered education regarding depression and her current score.  Suggested she call her PCP to discuss either an increased dosage of her Lexapro or changing her medication.  Pt agreed to do this.  Discussed coping skills and offered suggestions.     Visit start and stop times:    07/10/25  Start Time: 0759  Stop Time: 0855  Total Visit Time: 56 minutes

## 2025-07-10 NOTE — PROGRESS NOTES
"Name: Yuly More      : 2000      MRN: [id number]  Encounter Provider: Feliciano Hayes MD  Encounter Date: 7/10/2025   Encounter department: [facility]  :  Assessment & Plan  JOSTIN (generalized anxiety disorder)  States her anxiety has gotten worse  Today, she scored 14 on GAD7 at her therapy appt  She felt lexapro helped initially but has waned  Will increase to 20 mg and continue therapy  If it doesn't  improve, suggest adding Buspar   Orders:    escitalopram (LEXAPRO) 20 mg tablet; Take 1 tablet (20 mg total) by mouth daily           History of Present Illness   Here to discuss anxiety   Saw her therapist this morning and scored 14 on GAD7. In  the past, she scored 4  Sleeping more, tired, jimenez, and snippy  Was on Montelukast a few weeks ago and was experiencing crying spell  She has since stopped this medication   Mood improved a bit afterwards but worsened the last few weeks    Boyfriend's kids are now staying with her a week at the time vs weekends only          Review of Systems   HENT:  Positive for congestion, rhinorrhea and sneezing.    Psychiatric/Behavioral:  Positive for agitation and decreased concentration. The patient is nervous/anxious.        Objective   /80 (BP Location: Left arm, Patient Position: Sitting, Cuff Size: Standard)   Pulse (!) 115   Temp (!) 96 °F (35.6 °C) (Tympanic)   Resp 17   Ht 5' 2\" (1.575 m)   Wt 98.4 kg (217 lb)   SpO2 96%   BMI 39.69 kg/m²      Physical Exam  Vitals reviewed.   Constitutional:       Appearance: Normal appearance.   HENT:      Head: Normocephalic and atraumatic.     Eyes:      Extraocular Movements: Extraocular movements intact.     Pulmonary:      Effort: Pulmonary effort is normal.     Skin:     General: Skin is warm.     Neurological:      Mental Status: She is alert.     Psychiatric:         Mood and Affect: Mood normal.         Behavior: Behavior normal.         "

## 2025-07-23 ENCOUNTER — PATIENT MESSAGE (OUTPATIENT)
Dept: FAMILY MEDICINE CLINIC | Facility: CLINIC | Age: 25
End: 2025-07-23

## 2025-07-24 ENCOUNTER — OFFICE VISIT (OUTPATIENT)
Dept: FAMILY MEDICINE CLINIC | Facility: CLINIC | Age: 25
End: 2025-07-24
Payer: COMMERCIAL

## 2025-07-24 VITALS
DIASTOLIC BLOOD PRESSURE: 72 MMHG | SYSTOLIC BLOOD PRESSURE: 138 MMHG | HEIGHT: 62 IN | TEMPERATURE: 98.5 F | OXYGEN SATURATION: 97 % | BODY MASS INDEX: 40.37 KG/M2 | HEART RATE: 99 BPM | WEIGHT: 219.4 LBS

## 2025-07-24 DIAGNOSIS — F32.2 SEVERE MAJOR DEPRESSIVE DISORDER (HCC): ICD-10-CM

## 2025-07-24 DIAGNOSIS — B34.9 VIRAL ILLNESS: Primary | ICD-10-CM

## 2025-07-24 PROCEDURE — 99213 OFFICE O/P EST LOW 20 MIN: CPT | Performed by: FAMILY MEDICINE

## 2025-07-24 RX ORDER — ALBUTEROL SULFATE 90 UG/1
2 INHALANT RESPIRATORY (INHALATION) EVERY 6 HOURS PRN
Qty: 6.7 G | Refills: 0 | Status: SHIPPED | OUTPATIENT
Start: 2025-07-24

## 2025-07-24 RX ORDER — BENZONATATE 200 MG/1
200 CAPSULE ORAL 3 TIMES DAILY PRN
Qty: 20 CAPSULE | Refills: 0 | Status: SHIPPED | OUTPATIENT
Start: 2025-07-24

## 2025-07-24 RX ORDER — FLUTICASONE PROPIONATE 50 MCG
1 SPRAY, SUSPENSION (ML) NASAL DAILY
Qty: 9.9 ML | Refills: 0 | Status: SHIPPED | OUTPATIENT
Start: 2025-07-24

## 2025-07-25 ENCOUNTER — TELEMEDICINE (OUTPATIENT)
Dept: BEHAVIORAL/MENTAL HEALTH CLINIC | Facility: CLINIC | Age: 25
End: 2025-07-25
Payer: COMMERCIAL

## 2025-07-25 DIAGNOSIS — F32.A MILD DEPRESSION: ICD-10-CM

## 2025-07-25 DIAGNOSIS — F32.1 CURRENT MODERATE EPISODE OF MAJOR DEPRESSIVE DISORDER WITHOUT PRIOR EPISODE (HCC): Primary | ICD-10-CM

## 2025-07-25 PROCEDURE — 90834 PSYTX W PT 45 MINUTES: CPT | Performed by: SOCIAL WORKER

## 2025-07-29 ENCOUNTER — TELEMEDICINE (OUTPATIENT)
Dept: BEHAVIORAL/MENTAL HEALTH CLINIC | Facility: CLINIC | Age: 25
End: 2025-07-29
Payer: COMMERCIAL

## 2025-07-29 DIAGNOSIS — F32.1 CURRENT MODERATE EPISODE OF MAJOR DEPRESSIVE DISORDER WITHOUT PRIOR EPISODE (HCC): Primary | ICD-10-CM

## 2025-07-29 PROCEDURE — 90832 PSYTX W PT 30 MINUTES: CPT | Performed by: SOCIAL WORKER

## 2025-08-04 DIAGNOSIS — Z30.41 SURVEILLANCE FOR BIRTH CONTROL, ORAL CONTRACEPTIVES: ICD-10-CM

## 2025-08-05 RX ORDER — DESOGESTREL AND ETHINYL ESTRADIOL 21-5 (28)
1 KIT ORAL DAILY
Qty: 84 TABLET | Refills: 0 | Status: SHIPPED | OUTPATIENT
Start: 2025-08-05

## 2025-08-14 ENCOUNTER — TELEPHONE (OUTPATIENT)
Dept: OTHER | Facility: OTHER | Age: 25
End: 2025-08-14

## 2025-08-20 DIAGNOSIS — B34.9 VIRAL ILLNESS: ICD-10-CM

## 2025-08-20 RX ORDER — FLUTICASONE PROPIONATE 50 MCG
1 SPRAY, SUSPENSION (ML) NASAL DAILY
Qty: 48 G | Refills: 1 | Status: SHIPPED | OUTPATIENT
Start: 2025-08-20

## 2025-08-21 ENCOUNTER — SOCIAL WORK (OUTPATIENT)
Dept: BEHAVIORAL/MENTAL HEALTH CLINIC | Facility: CLINIC | Age: 25
End: 2025-08-21
Payer: COMMERCIAL

## 2025-08-21 DIAGNOSIS — F32.1 CURRENT MODERATE EPISODE OF MAJOR DEPRESSIVE DISORDER WITHOUT PRIOR EPISODE (HCC): Primary | ICD-10-CM

## 2025-08-21 DIAGNOSIS — F32.A MILD DEPRESSION: ICD-10-CM

## 2025-08-21 PROCEDURE — 90834 PSYTX W PT 45 MINUTES: CPT | Performed by: SOCIAL WORKER
